# Patient Record
Sex: MALE | Race: WHITE | NOT HISPANIC OR LATINO | Employment: FULL TIME | URBAN - METROPOLITAN AREA
[De-identification: names, ages, dates, MRNs, and addresses within clinical notes are randomized per-mention and may not be internally consistent; named-entity substitution may affect disease eponyms.]

---

## 2024-04-12 ENCOUNTER — OFFICE VISIT (OUTPATIENT)
Dept: FAMILY MEDICINE | Facility: CLINIC | Age: 24
End: 2024-04-12
Payer: COMMERCIAL

## 2024-04-12 VITALS
HEIGHT: 68 IN | HEART RATE: 77 BPM | SYSTOLIC BLOOD PRESSURE: 120 MMHG | OXYGEN SATURATION: 97 % | BODY MASS INDEX: 23.46 KG/M2 | DIASTOLIC BLOOD PRESSURE: 80 MMHG | WEIGHT: 154.8 LBS | TEMPERATURE: 98 F | RESPIRATION RATE: 16 BRPM

## 2024-04-12 DIAGNOSIS — J30.2 SEASONAL ALLERGIC RHINITIS, UNSPECIFIED TRIGGER: Primary | ICD-10-CM

## 2024-04-12 PROCEDURE — 99203 OFFICE O/P NEW LOW 30 MIN: CPT

## 2024-04-12 RX ORDER — FLUTICASONE PROPIONATE 50 MCG
1 SPRAY, SUSPENSION (ML) NASAL DAILY
Qty: 18.2 G | Refills: 1 | Status: SHIPPED | OUTPATIENT
Start: 2024-04-12 | End: 2024-08-05

## 2024-04-12 RX ORDER — MONTELUKAST SODIUM 10 MG/1
10 TABLET ORAL AT BEDTIME
Qty: 30 TABLET | Refills: 1 | Status: SHIPPED | OUTPATIENT
Start: 2024-04-12 | End: 2024-08-05

## 2024-04-12 ASSESSMENT — ENCOUNTER SYMPTOMS: WHEEZING: 1

## 2024-04-12 NOTE — PROGRESS NOTES
"  Assessment & Plan     Seasonal allergic rhinitis, unspecified trigger  Seasonal allergies exacerbated by mild viral upper respiratory infection.  Patient did feel some relief with antiallergy medication given to him by a family member.  Will start patient on montelukast today and also advised that may use fluticasone while having symptoms.  We also discussed that he may take Benadryl at night to help relieve symptoms.  We discussed artificial tears to help reduce any allergens that may be irritating eyes.  We discussed headache and bodyaches are likely due to viral illness and if patient is not improving with these measures to return to clinic.  - montelukast (SINGULAIR) 10 MG tablet; Take 1 tablet (10 mg) by mouth at bedtime  - fluticasone (FLONASE) 50 MCG/ACT nasal spray; Spray 1 spray into both nostrils daily                  Alli Zamudio is a 23 year old, presenting for the following health issues:  Allergies (Allergie concerns, nasal congestion, headaches, eye drainage x 4 days )        4/12/2024     9:51 AM   Additional Questions   Roomed by DESMOND José     Has symptoms of nasal congestion, left eye irritation, headache and body aches. Has not had any fevers that he knows of. Had mild sore throat that has since resolved.     Recently moved here from [place] and grew up in [place]    History of Present Illness       Headaches:   Since the patient's last clinic visit, headaches are: improved  The patient is getting headaches:  Every so often  He is able to do normal daily activities when he has a migraine.  The patient is taking the following rescue/relief medications:  Other   Patient states \"I get only a small amount of relief\" from the rescue/relief medications.   The patient is taking the following medications to prevent migraines:  No medications to prevent migraines  In the past 4 weeks, the patient has gone to an Urgent Care or Emergency Room 0 times times due to headaches.    Reason for visit:  " "Allergies  Symptom onset:  1-3 days ago    He eats 0-1 servings of fruits and vegetables daily.He consumes 2 sweetened beverage(s) daily.He exercises with enough effort to increase his heart rate 9 or less minutes per day.  He exercises with enough effort to increase his heart rate 3 or less days per week.   He is taking medications regularly.                     Objective    /80 (BP Location: Right arm, Patient Position: Sitting, Cuff Size: Adult Regular)   Pulse 77   Temp 98  F (36.7  C) (Tympanic)   Resp 16   Ht 1.727 m (5' 8\")   Wt 70.2 kg (154 lb 12.8 oz)   SpO2 97%   BMI 23.54 kg/m    Body mass index is 23.54 kg/m .  Physical Exam  Constitutional:       General: He is not in acute distress.     Appearance: Normal appearance. He is not ill-appearing.   HENT:      Head: Normocephalic.      Right Ear: Tympanic membrane normal.      Left Ear: Tympanic membrane normal.      Nose: Congestion and rhinorrhea present. No nasal tenderness.      Right Sinus: No maxillary sinus tenderness or frontal sinus tenderness.      Left Sinus: No maxillary sinus tenderness or frontal sinus tenderness.      Mouth/Throat:      Mouth: Mucous membranes are moist.      Pharynx: Oropharynx is clear.   Eyes:      Extraocular Movements: Extraocular movements intact.      Conjunctiva/sclera: Conjunctivae normal.   Cardiovascular:      Rate and Rhythm: Normal rate and regular rhythm.   Pulmonary:      Effort: Pulmonary effort is normal.      Breath sounds: Normal breath sounds. No wheezing, rhonchi or rales.   Musculoskeletal:      Cervical back: Normal range of motion. No tenderness.   Lymphadenopathy:      Cervical: No cervical adenopathy.   Skin:     General: Skin is warm and dry.      Capillary Refill: Capillary refill takes less than 2 seconds.   Neurological:      Mental Status: He is alert and oriented to person, place, and time.   Psychiatric:         Behavior: Behavior normal.         Thought Content: Thought content " normal.                    Signed Electronically by: RENALDO Eaton CNP

## 2024-06-08 ENCOUNTER — NURSE TRIAGE (OUTPATIENT)
Dept: NURSING | Facility: CLINIC | Age: 24
End: 2024-06-08
Payer: COMMERCIAL

## 2024-06-08 NOTE — TELEPHONE ENCOUNTER
Reason for Disposition   Requesting help for narcotic (opioid) withdrawal symptoms (e.g., anxiety, body aches, dirarrhea, gooseflesh, sweating, vomiting)    Additional Information   Negative: Coma (e.g., not moving, not talking, not responding to stimuli)   Negative: Difficult to awaken or acting confused (e.g., disoriented, slurred speech)   Negative: Seeing, hearing, or feeling things that are not there (i.e., visual, auditory, or tactile hallucinations)   Negative: Slow, shallow and weak breathing   Negative: Seizure   Negative: Violent behavior, or threatening to physically hurt or kill someone   Negative: Sounds like a life-threatening emergency to the triager   Negative: Suicide thoughts, threats and attempts, questions or concerns about   Negative: Recent significant injury, see that guideline (e.g., head, neck, chest, abdominal or extremity  injury)   Negative: Other significant medical symptom is present, see that guideline (e.g., chest pain, headache, vomiting)   Negative: Alcohol use, abuse or dependence: question or problem related to   Negative: Depression is main problem or symptom (e.g., feelings of sadness or hopelessness)   Negative: [1] Fever > 100.0 F (37.8 C) AND [2] IVDA (intravenous drug abuse)   Negative: Very strange, paranoid or confused behavior   Negative: Feeling very shaky (i.e., visible tremors of hands)   Negative: [1] Pregnant AND [2] symptoms of narcotic withdrawal (e.g., vomiting, severe muscle cramps)   Negative: [1] SEVERE vomiting (e.g., 6 or more times/day) AND [2] present > 8 hours   Negative: [1] MODERATE vomiting (e.g., 3 - 5 times/day) AND [2] age > 60   Negative: Patient sounds very anxious or agitated   Negative: Patient sounds very sick or weak to the triager   Negative: White of the eyes have turned yellow (i.e., jaundice)   Negative: [1] Fever > 100.0 F (37.8 C) AND [2] diabetes mellitus or weak immune system (e.g., HIV positive, cancer chemo, splenectomy, organ  transplant, chronic steroids)   Negative: Fever present > 3 days (72 hours)   Negative: Requesting admission for substance (drug) abuse   Negative: Requesting to talk with a counselor (mental health worker, psychiatrist, etc.)    Protocols used: Substance Abuse and [id number]  The mother is calling regarding the patient withdrawel attempt from Fentanyl  Verbal permission was granted by the patient and then the patient completed the interview  He reports he is taking street Fentanyl and reports he has taken his last dose today on 06/08/24 at 0800 am.  He is inquiring about assistance to help him with withdrawal when the occur  He reports he previous received atarax, and reports it was not therapeutic  Triage advised the patient to seek intake at [facility] or [facility]  Triage guidelines recommend to call pcp within 24 hours  Caller verbalized and understands directives

## 2024-06-12 ENCOUNTER — TELEPHONE (OUTPATIENT)
Dept: FAMILY MEDICINE | Facility: CLINIC | Age: 24
End: 2024-06-12

## 2024-06-12 NOTE — TELEPHONE ENCOUNTER
"  General Call    Contacts         Type Contact Phone/Fax    06/12/2024 05:51 PM CDT Phone (Incoming) Robb Howard (Self) [phone] (M)          Reason for Call:  Pt looking for a provider that deals with opioid addication     What are your questions or concerns:  Pt states he saw Sharon Leon on 04/12/2024 and was wondering if she prescribes medications for opioid addiction? If Sharon does not prescribe \"suboxone\" Pt is wondering who else she would recommend for him to see? Pt would like to stay in the  area and does not want to go to the [facility].    Date of last appointment with provider: 04/12/2024    Okay to leave a detailed message?: Yes at Cell number on file:    Telephone Information:   Mobile [phone]     Siri Tenorio"

## 2024-06-13 NOTE — TELEPHONE ENCOUNTER
Left message on unidentified VM requesting a call back. Sharon Leon does prescribed suboxone, when he returns call we can assist in scheduling appointment in clinic with her.

## 2024-06-17 NOTE — TELEPHONE ENCOUNTER
LVM informing Dr. Obando, Dr. Irizarry and Sharon Leon are providers who prescribe suboxone. Requested return call to schedule OV with a provider.

## 2024-08-05 ENCOUNTER — OFFICE VISIT (OUTPATIENT)
Dept: FAMILY MEDICINE | Facility: CLINIC | Age: 24
End: 2024-08-05
Payer: COMMERCIAL

## 2024-08-05 VITALS
BODY MASS INDEX: 23.54 KG/M2 | HEIGHT: 68 IN | RESPIRATION RATE: 18 BRPM | WEIGHT: 155.3 LBS | HEART RATE: 95 BPM | SYSTOLIC BLOOD PRESSURE: 120 MMHG | DIASTOLIC BLOOD PRESSURE: 80 MMHG | TEMPERATURE: 97.9 F | OXYGEN SATURATION: 97 %

## 2024-08-05 DIAGNOSIS — F11.93 OPIOID WITHDRAWAL (H): Primary | ICD-10-CM

## 2024-08-05 PROCEDURE — 99213 OFFICE O/P EST LOW 20 MIN: CPT | Performed by: PHYSICIAN ASSISTANT

## 2024-08-05 RX ORDER — LORAZEPAM 1 MG/1
1 TABLET ORAL EVERY 8 HOURS PRN
Qty: 15 TABLET | Refills: 0 | Status: SHIPPED | OUTPATIENT
Start: 2024-08-05 | End: 2024-08-10

## 2024-08-05 ASSESSMENT — ENCOUNTER SYMPTOMS: NERVOUS/ANXIOUS: 1

## 2024-08-05 NOTE — PROGRESS NOTES
"  Assessment & Plan     (F11.93) Opioid withdrawal (H)  (primary encounter diagnosis)  Comment: Symptoms noted  Plan: LORazepam (ATIVAN) 1 MG tablet        Lorazepam short course only follow-up when he gets to [place] prior as needed or the emergency room if indicated                Subjective   Robb is a 24 year old, presenting for the following health issues:  Anxiety and Withdrawal (Fentanyl last dose yesterday, having headache with nausea  /)        8/5/2024     4:49 PM   Additional Questions   Roomed by MARTHA Oakes     24-year-old male with history of fentanyl use presents to the clinic exhibiting  withdrawal symptoms last use yesterday having some looser stools and headache  No homicidal or suicidal ideation  He is moving to [place] later this week  He is on sertraline  He has been in treatment prior obviously because he is moving in the near future treatment is not an option here  He has been on Suboxone previously did not find it to be helpful and still used other medications    History of Present Illness       Reason for visit:  Anxiety and withdrawal pains    He eats 0-1 servings of fruits and vegetables daily.He consumes 2 sweetened beverage(s) daily.He exercises with enough effort to increase his heart rate 9 or less minutes per day.  He exercises with enough effort to increase his heart rate 3 or less days per week. He is missing 2 dose(s) of medications per week.  He is not taking prescribed medications regularly due to remembering to take.                     Objective    /80 (BP Location: Right arm, Patient Position: Sitting, Cuff Size: Adult Regular)   Pulse 95   Temp 97.9  F (36.6  C) (Tympanic)   Resp 18   Ht 1.727 m (5' 8\")   Wt 70.4 kg (155 lb 4.8 oz)   SpO2 97%   BMI 23.61 kg/m    Body mass index is 23.61 kg/m .  Physical Exam is are stable he is afebrile  He is not diaphoretic  Lungs clear  Cardiovascular regular rate and rhythm              Signed Electronically by: Sharath SHELDON" KATHERINE Francisco

## 2025-07-10 ENCOUNTER — OFFICE VISIT (OUTPATIENT)
Dept: PRIMARY CARE | Facility: CLINIC | Age: 25
End: 2025-07-10
Payer: COMMERCIAL

## 2025-07-10 VITALS
SYSTOLIC BLOOD PRESSURE: 110 MMHG | DIASTOLIC BLOOD PRESSURE: 70 MMHG | WEIGHT: 154 LBS | HEIGHT: 67 IN | BODY MASS INDEX: 24.17 KG/M2 | HEART RATE: 97 BPM | OXYGEN SATURATION: 98 %

## 2025-07-10 DIAGNOSIS — Z00.00 ROUTINE GENERAL MEDICAL EXAMINATION AT A HEALTH CARE FACILITY: ICD-10-CM

## 2025-07-10 DIAGNOSIS — F41.9 ANXIETY: Primary | ICD-10-CM

## 2025-07-10 DIAGNOSIS — Z00.00 HEALTHCARE MAINTENANCE: ICD-10-CM

## 2025-07-10 PROCEDURE — 3008F BODY MASS INDEX DOCD: CPT | Performed by: FAMILY MEDICINE

## 2025-07-10 PROCEDURE — 1036F TOBACCO NON-USER: CPT | Performed by: FAMILY MEDICINE

## 2025-07-10 PROCEDURE — 99385 PREV VISIT NEW AGE 18-39: CPT | Performed by: FAMILY MEDICINE

## 2025-07-10 RX ORDER — DIAZEPAM 5 MG/1
5 TABLET ORAL 3 TIMES DAILY PRN
Qty: 15 TABLET | Refills: 0 | Status: SHIPPED | OUTPATIENT
Start: 2025-07-10 | End: 2025-07-15

## 2025-07-10 ASSESSMENT — PATIENT HEALTH QUESTIONNAIRE - PHQ9
2. FEELING DOWN, DEPRESSED OR HOPELESS: NOT AT ALL
SUM OF ALL RESPONSES TO PHQ9 QUESTIONS 1 AND 2: 0
1. LITTLE INTEREST OR PLEASURE IN DOING THINGS: NOT AT ALL

## 2025-07-10 NOTE — PROGRESS NOTES
Subjective   Patient ID: Eduardo Matthews is a 25 y.o. male who presents for No chief complaint on file..  HPI    Patient presents today for a physical. Pt does not need form filled out. Pt will be fasting for blood work. Tries to eat a generally healthy diet. Exercises never.     Patient states he would like something to help with his addiction problem to fentanyl. Patient states he would like to drop this drug completely. Patient states he took his last of it this morning and knows the withdrawals are about to start. Patient has been doing this on and off since 2019.     Review of Systems  Constitutional:  no chills, no fever and no night sweats.  Eyes: no blurred vision and no eyesight problems.  ENT: no hearing loss, no nasal congestion, no hoarseness and no sore throat.  Neck: no mass (es) and no swelling.  Cardiovascular: no chest pain, no intermittent leg claudication, no lower extremity edema, no palpitation and no syncope.  Respiratory: no cough, no shortness of breath during exertion, no shortness of breath at rest and no wheezing.  Gastrointestinal: no abdominal pain, no blood in stools, no constipation, no diarrhea, no melena, no nausea, no rectal pain and no vomiting.  Genitourinary: no dysuria, no change in urinary frequency, no urinary hesitancy and no feelings of urinary urgency.  Musculoskeletal: no arthralgias, no back pain and no myalgias.  Integumentary: no new skin lesions and no rashes.  Neurological: no difficulty walking, no headache, no limb weakness, no numbness and no tingling.  Psychiatric/Behavioral: no anxiety, no depression, no anhedonia and no substance use disorders.  Endocrine: no recent weight gain and no recent weight loss.  Hematologic/Lymphatic: no tendency for easy bruising and no swollen glands    Objective   Physical Exam  Patient in for physical exam not been seen in a while he has had problems with fentanyl abuse which he buys mostly on the street since 2019 he has been in and  "out of rehab wants to try to get off of this again took his last 1 5:00 in the morning needs something for the anxiety and jitteriness while he is going through withdrawal he has done this before.  No other complaints well-developed well-nourished thin muscular male in no acute distress physical exam today's office visit constitutional alert and oriented x3.    Head is atraumatic HEENT is within normal limits.    Neck supple no masses full range of motion.    Thyroid is normal in size no thyromegaly there is no carotid bruits.    Pulmonary exam shows clear to auscultation no respiratory distress.    Cardiovascular shows no murmur rub or gallop.  Regular rate and rhythm.    Abdominal exam soft nontender no hepatosplenomegaly or masses normal bowel sounds no rebound no guarding.    Musculoskeletal exam no joint pain no muscle pain full range of motion.    Psych exam normal mood and affect.    Dermatologic exam no skin lesions no rash no blemishes.    Neuro exam is no focal deficits.  Normal exam.    Extremities no edema normal pulses normal capillary refill.    There were no vitals taken for this visit.    No results found for: \"WBC\", \"HGB\", \"HCT\", \"MCV\", \"PLT\"    Assessment/Plan assessment is fentanyl addiction going through the starts of detox says he has been through detox before he knows what he needs to do he lives with his aunt and uncle they are a good support system for him he is given a prescription for 5 mg Valium that he can take up to 3 times a day if necessary he has to follow-up with me in 72 hours.  He was only given 15 tablets.  Problem List Items Addressed This Visit    None    "

## 2025-07-17 ENCOUNTER — OFFICE VISIT (OUTPATIENT)
Dept: PRIMARY CARE | Facility: CLINIC | Age: 25
End: 2025-07-17
Payer: COMMERCIAL

## 2025-07-17 VITALS
HEART RATE: 95 BPM | BODY MASS INDEX: 24.14 KG/M2 | OXYGEN SATURATION: 96 % | SYSTOLIC BLOOD PRESSURE: 106 MMHG | HEIGHT: 67 IN | WEIGHT: 153.8 LBS | TEMPERATURE: 97.6 F | DIASTOLIC BLOOD PRESSURE: 78 MMHG

## 2025-07-17 DIAGNOSIS — Z51.81 THERAPEUTIC DRUG MONITORING: ICD-10-CM

## 2025-07-17 DIAGNOSIS — F41.9 ANXIETY: ICD-10-CM

## 2025-07-17 PROCEDURE — 1036F TOBACCO NON-USER: CPT | Performed by: FAMILY MEDICINE

## 2025-07-17 PROCEDURE — 99213 OFFICE O/P EST LOW 20 MIN: CPT | Performed by: FAMILY MEDICINE

## 2025-07-17 PROCEDURE — 3008F BODY MASS INDEX DOCD: CPT | Performed by: FAMILY MEDICINE

## 2025-07-17 RX ORDER — DIAZEPAM 5 MG/1
5 TABLET ORAL 3 TIMES DAILY PRN
Qty: 15 TABLET | Refills: 1 | Status: SHIPPED | OUTPATIENT
Start: 2025-07-17 | End: 2025-07-17 | Stop reason: SDUPTHER

## 2025-07-17 RX ORDER — DIAZEPAM 5 MG/1
5 TABLET ORAL 3 TIMES DAILY PRN
Qty: 15 TABLET | Refills: 1 | Status: SHIPPED | OUTPATIENT
Start: 2025-07-17

## 2025-07-17 ASSESSMENT — PATIENT HEALTH QUESTIONNAIRE - PHQ9
1. LITTLE INTEREST OR PLEASURE IN DOING THINGS: NOT AT ALL
SUM OF ALL RESPONSES TO PHQ9 QUESTIONS 1 AND 2: 0
2. FEELING DOWN, DEPRESSED OR HOPELESS: NOT AT ALL

## 2025-07-17 ASSESSMENT — ENCOUNTER SYMPTOMS: DEPRESSION: 0

## 2025-07-17 NOTE — PROGRESS NOTES
Subjective   Patient ID: Eduardo Matthews is a 25 y.o. male who presents for ADHD and Generalized Body Aches.  HPI    Anxiety follow up  Taking the Valium  Working well   65-70% effective   Denies any side effects   OAR[place] reviewed today   CSA 7/17/25  Last took Monday    Patient states he is having constant aches in his thighs and would like something to help with the aches. Ongoing x years. Patient states it started when he was going in and out of withdrawal.    Review of Systems  Constitutional:  no chills, no fever and no night sweats.  Eyes: no blurred vision and no eyesight problems.  ENT: no hearing loss, no nasal congestion, no hoarseness and no sore throat.  Neck: no mass (es) and no swelling.  Cardiovascular: no chest pain, no intermittent leg claudication, no lower extremity edema, no palpitation and no syncope.  Respiratory: no cough, no shortness of breath during exertion, no shortness of breath at rest and no wheezing.  Gastrointestinal: no abdominal pain, no blood in stools, no constipation, no diarrhea, no melena, no nausea, no rectal pain and no vomiting.  Genitourinary: no dysuria, no change in urinary frequency, no urinary hesitancy and no feelings of urinary urgency.  Musculoskeletal: no arthralgias, no back pain and no myalgias.  Integumentary: no new skin lesions and no rashes.  Neurological: no difficulty walking, no headache, no limb weakness, no numbness and no tingling.  Psychiatric/Behavioral: no anxiety, no depression, no anhedonia and no substance use disorders.  Endocrine: no recent weight gain and no recent weight loss.  Hematologic/Lymphatic: no tendency for easy bruising and no swollen glands    Objective   Physical Exam  Patient in wound complaints of anxiety started using the again then stopped now had some withdrawal symptoms intermittent aches from the knees down in the calfs typically not in the thighs but occasionally they are to not bothering him much this morning he has been  "trying to be more physically active and walk more.  No abnormalities in the exam of the thighs and calves today.  Thin male in no acute distress history of drug abuse.  Advised that he get back into outpatient therapy or rehab he has done this before feels like he needs to do this on his own.  Fabiola had success with as needed Valium use when he felt like he needed to use street drugs.  We will give him a small amount of Valium to use as needed he has not abused this in the past.  Advised that he can use over-the-counter Aleve or ibuprofen or Tylenol when he gets the aches warm water soaks and stretches may also help.  /78 (BP Location: Left arm, Patient Position: Sitting, BP Cuff Size: Adult)   Pulse 95   Temp 36.4 °C (97.6 °F) (Temporal)   Ht 1.702 m (5' 7\")   Wt 69.8 kg (153 lb 12.8 oz)   SpO2 96%   BMI 24.09 kg/m²     No results found for: \"WBC\", \"HGB\", \"HCT\", \"MCV\", \"PLT\"    Assessment/Plan recommend follow-up in 2 months or as needed  Problem List Items Addressed This Visit    None  Visit Diagnoses         Anxiety          Therapeutic drug monitoring        Relevant Orders    Opiate/Opioid/Benzo Prescription Compliance      BMI 24.0-24.9, adult              "

## 2025-07-17 NOTE — TELEPHONE ENCOUNTER
Rx Controlled Refill Request Telephone Encounter      PT called in stating the pharmacy does not have this medication, looking at the chart it says failed to e prescribe at 9:20    Name: Eduardo Matthews  :  2000    diazePAM (Valium) 5 mg tablet [[id number]]    Order Details  Dose: 5 mg Route: oral Frequency: 3 times daily PRN for anxiety, sleep, muscle spasms   Dispense Quantity: 15 tablet (5 day supply) Refills: 1    Duration: -- Dispense As Written: No          Sig: Take 1 tablet (5 mg) by mouth 3 times a day as needed for anxiety, sleep or muscle spasms.     ALLERGIES:    none    LAST DRUG SCREEN/MED CONTRACT:         Specific Pharmacy location:      [facility] [address] [place] [address]  Phone: [phone]  Fax: [phone]  CAROL #: [phone]       Date of last appointment:    25  Date of next appointment:        Best number to reach patient:    [phone]

## 2025-07-17 NOTE — TELEPHONE ENCOUNTER
Patient is calling in regards to his RX for his Diazepam 5 mg, he was here today. The RX status states that: transmission failed to pharmacy,  Can you please resend his Diazepam 5 mg to [facility] [address] [place] (old  one)  He has called twice.  Thanks

## 2025-07-20 LAB
1OH-MIDAZOLAM UR-MCNC: NEGATIVE NG/ML
7AMINOCLONAZEPAM UR-MCNC: NEGATIVE NG/ML
A-OH ALPRAZ UR-MCNC: NEGATIVE NG/ML
A-OH-TRIAZOLAM UR-MCNC: NEGATIVE NG/ML
AMPHETAMINES UR QL: NEGATIVE NG/ML
BARBITURATES UR QL: NEGATIVE NG/ML
BZE UR QL: NEGATIVE NG/ML
CODEINE UR-MCNC: NEGATIVE NG/ML
CREAT UR-MCNC: 117.1 MG/DL
DRUG SCREEN COMMENT UR-IMP: ABNORMAL
EDDP UR-MCNC: NEGATIVE NG/ML
FENTANYL UR-MCNC: ABNORMAL NG/ML
HYDROCODONE UR-MCNC: NEGATIVE NG/ML
HYDROMORPHONE UR-MCNC: NEGATIVE NG/ML
LORAZEPAM UR-MCNC: NEGATIVE NG/ML
METHADONE UR-MCNC: NEGATIVE NG/ML
MORPHINE UR-MCNC: NEGATIVE NG/ML
NORDIAZEPAM UR-MCNC: 168 NG/ML
NORFENTANYL UR-MCNC: ABNORMAL NG/ML
NORHYDROCODONE UR CFM-MCNC: NEGATIVE NG/ML
NOROXYCODONE UR CFM-MCNC: NEGATIVE NG/ML
NORTRAMADOL UR-MCNC: NEGATIVE NG/ML
OH-ETHYLFLURAZ UR-MCNC: NEGATIVE NG/ML
OXAZEPAM UR-MCNC: 948 NG/ML
OXIDANTS UR QL: NEGATIVE MCG/ML
OXYCODONE UR CFM-MCNC: NEGATIVE NG/ML
OXYMORPHONE UR CFM-MCNC: NEGATIVE NG/ML
PCP UR QL: NEGATIVE NG/ML
PH UR: 7.3 [PH] (ref 4.5–9)
QUEST 6 ACETYLMORPHINE: ABNORMAL
QUEST NOTES AND COMMENTS: ABNORMAL
QUEST PATIENT HISTORICAL REPORT: ABNORMAL
QUEST ZOLPIDEM: ABNORMAL
TEMAZEPAM UR-MCNC: 428 NG/ML
THC UR QL: POSITIVE NG/ML
THC UR-MCNC: 3302 NG/ML
TRAMADOL UR-MCNC: NEGATIVE NG/ML
ZOLPIDEM PHENYL-4-CARB UR CFM-MCNC: ABNORMAL NG/ML

## 2025-07-21 LAB
1OH-MIDAZOLAM UR-MCNC: NEGATIVE NG/ML
7AMINOCLONAZEPAM UR-MCNC: NEGATIVE NG/ML
A-OH ALPRAZ UR-MCNC: NEGATIVE NG/ML
A-OH-TRIAZOLAM UR-MCNC: NEGATIVE NG/ML
AMPHETAMINES UR QL: NEGATIVE NG/ML
BARBITURATES UR QL: NEGATIVE NG/ML
BZE UR QL: NEGATIVE NG/ML
CODEINE UR-MCNC: NEGATIVE NG/ML
CREAT UR-MCNC: 117.1 MG/DL
DRUG SCREEN COMMENT UR-IMP: ABNORMAL
EDDP UR-MCNC: NEGATIVE NG/ML
FENTANYL UR-MCNC: 1 NG/ML
HYDROCODONE UR-MCNC: NEGATIVE NG/ML
HYDROMORPHONE UR-MCNC: NEGATIVE NG/ML
LORAZEPAM UR-MCNC: NEGATIVE NG/ML
METHADONE UR-MCNC: NEGATIVE NG/ML
MORPHINE UR-MCNC: NEGATIVE NG/ML
NORDIAZEPAM UR-MCNC: 168 NG/ML
NORFENTANYL UR-MCNC: 42.7 NG/ML
NORHYDROCODONE UR CFM-MCNC: NEGATIVE NG/ML
NOROXYCODONE UR CFM-MCNC: NEGATIVE NG/ML
NORTRAMADOL UR-MCNC: NEGATIVE NG/ML
OH-ETHYLFLURAZ UR-MCNC: NEGATIVE NG/ML
OXAZEPAM UR-MCNC: 948 NG/ML
OXIDANTS UR QL: NEGATIVE MCG/ML
OXYCODONE UR CFM-MCNC: NEGATIVE NG/ML
OXYMORPHONE UR CFM-MCNC: NEGATIVE NG/ML
PCP UR QL: NEGATIVE NG/ML
PH UR: 7.3 [PH] (ref 4.5–9)
QUEST 6 ACETYLMORPHINE: NEGATIVE NG/ML
QUEST NOTES AND COMMENTS: ABNORMAL
QUEST ZOLPIDEM: NEGATIVE NG/ML
TEMAZEPAM UR-MCNC: 428 NG/ML
THC UR QL: POSITIVE NG/ML
THC UR-MCNC: 3302 NG/ML
TRAMADOL UR-MCNC: NEGATIVE NG/ML
ZOLPIDEM PHENYL-4-CARB UR CFM-MCNC: NEGATIVE NG/ML

## 2025-07-31 ENCOUNTER — TELEPHONE (OUTPATIENT)
Dept: PRIMARY CARE | Facility: CLINIC | Age: 25
End: 2025-07-31
Payer: COMMERCIAL

## 2025-07-31 NOTE — TELEPHONE ENCOUNTER
Dann Sun MD to Do [id number] [facility] Clinical Support Staff  (Selected Message)      7/21/25  9:15 AM  Result Note  Drug screen positive for marijuana.  No more controlled substances till he has a clean drug screen

## 2025-08-29 ENCOUNTER — OFFICE VISIT (OUTPATIENT)
Dept: PRIMARY CARE | Facility: CLINIC | Age: 25
End: 2025-08-29
Payer: COMMERCIAL

## 2025-08-29 VITALS
BODY MASS INDEX: 25.9 KG/M2 | HEIGHT: 67 IN | SYSTOLIC BLOOD PRESSURE: 106 MMHG | WEIGHT: 165 LBS | DIASTOLIC BLOOD PRESSURE: 62 MMHG | OXYGEN SATURATION: 99 % | HEART RATE: 67 BPM

## 2025-08-29 DIAGNOSIS — F32.A DEPRESSION, UNSPECIFIED DEPRESSION TYPE: ICD-10-CM

## 2025-08-29 PROCEDURE — 99213 OFFICE O/P EST LOW 20 MIN: CPT | Performed by: FAMILY MEDICINE

## 2025-08-29 PROCEDURE — 3008F BODY MASS INDEX DOCD: CPT | Performed by: FAMILY MEDICINE

## 2025-08-29 PROCEDURE — 1036F TOBACCO NON-USER: CPT | Performed by: FAMILY MEDICINE

## 2025-08-29 RX ORDER — FLUOXETINE HYDROCHLORIDE 40 MG/1
40 CAPSULE ORAL
Qty: 30 CAPSULE | Refills: 3 | Status: SHIPPED | OUTPATIENT
Start: 2025-08-29

## 2025-08-29 RX ORDER — DOXEPIN HYDROCHLORIDE 50 MG/1
50 CAPSULE ORAL NIGHTLY
Qty: 30 CAPSULE | Refills: 3 | Status: SHIPPED | OUTPATIENT
Start: 2025-08-29 | End: 2026-08-29

## 2025-08-29 RX ORDER — FLUOXETINE HYDROCHLORIDE 40 MG/1
40 CAPSULE ORAL
COMMUNITY
Start: 2025-08-04 | End: 2025-08-29 | Stop reason: SDUPTHER

## 2025-08-29 RX ORDER — METHOCARBAMOL 750 MG/1
1 TABLET, FILM COATED ORAL EVERY 6 HOURS
COMMUNITY
Start: 2025-07-28

## 2025-08-29 RX ORDER — LANOLIN ALCOHOL/MO/W.PET/CERES
1 CREAM (GRAM) TOPICAL NIGHTLY
COMMUNITY
Start: 2025-07-28

## 2025-08-29 ASSESSMENT — PATIENT HEALTH QUESTIONNAIRE - PHQ9
2. FEELING DOWN, DEPRESSED OR HOPELESS: SEVERAL DAYS
1. LITTLE INTEREST OR PLEASURE IN DOING THINGS: NOT AT ALL
SUM OF ALL RESPONSES TO PHQ9 QUESTIONS 1 AND 2: 1

## 2025-08-31 LAB
1OH-MIDAZOLAM UR-MCNC: ABNORMAL NG/ML
7AMINOCLONAZEPAM UR-MCNC: ABNORMAL NG/ML
A-OH ALPRAZ UR-MCNC: ABNORMAL NG/ML
A-OH-TRIAZOLAM UR-MCNC: ABNORMAL NG/ML
AMPHETAMINES UR QL: NEGATIVE NG/ML
BARBITURATES UR QL: NEGATIVE NG/ML
BZE UR QL: NEGATIVE NG/ML
CODEINE UR-MCNC: ABNORMAL NG/ML
CREAT UR-MCNC: 196.2 MG/DL
DRUG SCREEN COMMENT UR-IMP: ABNORMAL
EDDP UR-MCNC: ABNORMAL NG/ML
FENTANYL UR-MCNC: ABNORMAL NG/ML
HYDROCODONE UR-MCNC: ABNORMAL UG/ML
HYDROMORPHONE UR-MCNC: ABNORMAL NG/ML
LORAZEPAM UR-MCNC: ABNORMAL NG/ML
METHADONE UR-MCNC: ABNORMAL UG/L
MORPHINE UR-MCNC: ABNORMAL NG/ML
NORDIAZEPAM UR-MCNC: ABNORMAL NG/ML
NORFENTANYL UR-MCNC: ABNORMAL NG/ML
NORHYDROCODONE UR CFM-MCNC: ABNORMAL NG/ML
NOROXYCODONE UR CFM-MCNC: ABNORMAL NG/ML
NORTRAMADOL UR-MCNC: NEGATIVE NG/ML
OH-ETHYLFLURAZ UR-MCNC: ABNORMAL NG/ML
OXAZEPAM UR-MCNC: ABNORMAL UG/ML
OXIDANTS UR QL: NEGATIVE MCG/ML
OXYCODONE UR CFM-MCNC: ABNORMAL NG/ML
OXYMORPHONE UR CFM-MCNC: ABNORMAL NG/ML
PCP UR QL: NEGATIVE NG/ML
PH UR: 7.2 [PH] (ref 4.5–9)
QUEST 6 ACETYLMORPHINE: ABNORMAL
QUEST NOTES AND COMMENTS: ABNORMAL
QUEST PATIENT HISTORICAL REPORT: ABNORMAL
QUEST ZOLPIDEM: ABNORMAL
TEMAZEPAM UR-MCNC: ABNORMAL NG/ML
THC UR QL: POSITIVE NG/ML
THC UR-MCNC: 1123 NG/ML
TRAMADOL UR-MCNC: NEGATIVE NG/ML
ZOLPIDEM PHENYL-4-CARB UR CFM-MCNC: ABNORMAL NG/ML

## 2025-09-03 ENCOUNTER — RESULTS FOLLOW-UP (OUTPATIENT)
Dept: PRIMARY CARE | Facility: CLINIC | Age: 25
End: 2025-09-03
Payer: COMMERCIAL

## 2025-09-06 LAB
1OH-MIDAZOLAM UR-MCNC: NEGATIVE NG/ML
7AMINOCLONAZEPAM UR-MCNC: NEGATIVE NG/ML
A-OH ALPRAZ UR-MCNC: NEGATIVE NG/ML
A-OH-TRIAZOLAM UR-MCNC: NEGATIVE NG/ML
AMPHETAMINES UR QL: NEGATIVE NG/ML
BARBITURATES UR QL: NEGATIVE NG/ML
BZE UR QL: NEGATIVE NG/ML
CODEINE UR-MCNC: NEGATIVE NG/ML
CREAT UR-MCNC: 196.2 MG/DL
DRUG SCREEN COMMENT UR-IMP: ABNORMAL
EDDP UR-MCNC: NEGATIVE NG/ML
FENTANYL UR-MCNC: NEGATIVE NG/ML
HYDROCODONE UR-MCNC: NEGATIVE NG/ML
HYDROMORPHONE UR-MCNC: NEGATIVE NG/ML
LORAZEPAM UR-MCNC: NEGATIVE NG/ML
METHADONE UR-MCNC: NEGATIVE NG/ML
MORPHINE UR-MCNC: NEGATIVE NG/ML
NORDIAZEPAM UR-MCNC: NEGATIVE NG/ML
NORFENTANYL UR-MCNC: NEGATIVE NG/ML
NORHYDROCODONE UR CFM-MCNC: NEGATIVE NG/ML
NOROXYCODONE UR CFM-MCNC: NEGATIVE NG/ML
NORTRAMADOL UR-MCNC: NEGATIVE NG/ML
OH-ETHYLFLURAZ UR-MCNC: NEGATIVE NG/ML
OXAZEPAM UR-MCNC: 84 NG/ML
OXIDANTS UR QL: NEGATIVE MCG/ML
OXYCODONE UR CFM-MCNC: NEGATIVE NG/ML
OXYMORPHONE UR CFM-MCNC: NEGATIVE NG/ML
PCP UR QL: NEGATIVE NG/ML
PH UR: 7.2 [PH] (ref 4.5–9)
QUEST 6 ACETYLMORPHINE: NEGATIVE NG/ML
QUEST NOTES AND COMMENTS: ABNORMAL
QUEST ZOLPIDEM: NEGATIVE NG/ML
TEMAZEPAM UR-MCNC: 121 NG/ML
THC UR QL: POSITIVE NG/ML
THC UR-MCNC: 1123 NG/ML
TRAMADOL UR-MCNC: NEGATIVE NG/ML
ZOLPIDEM PHENYL-4-CARB UR CFM-MCNC: NEGATIVE NG/ML